# Patient Record
(demographics unavailable — no encounter records)

---

## 2025-07-09 NOTE — HISTORY OF PRESENT ILLNESS
[FreeTextEntry1] : Dark bumps from bug bites.  [de-identified] : No personal or family history of cutaneous malignancy  with father and 4yo sister translation [employer] [place]

## 2025-07-09 NOTE — ASSESSMENT
[FreeTextEntry1] : Alert, well, responsive.  Given lotion by Dr Diggs that has helped a little. father c/o marks on legs after mosquito bites.  Severe reactions with swelling and then picks. Sister without similar reaction to bites.  numerous brown macules. few crusts, excoriations Mosquito bite reactions. Post inflammatory hyperpigmentation excoriations.  start hydrocortisone 2.5%cream to new itchy areas twice per day. Side effects discussed with patient. start sunscreen to prevent pigmentation issues. Prevention, DEET or picaridin lotion when outside to prevent mosquito bites. Eileen family lotion recommended.